# Patient Record
Sex: FEMALE | Race: WHITE
[De-identification: names, ages, dates, MRNs, and addresses within clinical notes are randomized per-mention and may not be internally consistent; named-entity substitution may affect disease eponyms.]

---

## 2019-02-06 ENCOUNTER — HOSPITAL ENCOUNTER (OUTPATIENT)
Dept: HOSPITAL 76 - DI | Age: 59
Discharge: HOME | End: 2019-02-06
Attending: FAMILY MEDICINE
Payer: COMMERCIAL

## 2019-02-06 DIAGNOSIS — M85.89: Primary | ICD-10-CM

## 2019-02-06 DIAGNOSIS — Z78.0: ICD-10-CM

## 2019-02-06 DIAGNOSIS — R29.890: ICD-10-CM

## 2019-02-06 PROCEDURE — 77080 DXA BONE DENSITY AXIAL: CPT

## 2019-02-06 NOTE — DEXA REPORT
Reason:  ASYMPTOMATIC MENOPAUSAL STATE,LOSS OF HEIGHT

Procedure Date:  02/06/2019   

Accession Number:  [id number] / [id number]                    

Procedure:  DEX - Dexa Spine and/or Hip CPT Code:  

 

FULL RESULT:

 

 

EXAM:

DUAL EMISSION X-RAY ABSORPTIOMETRY (DXA) SCAN

 

EXAM DATE: 2/6/2019 03:09 PM.

 

CLINICAL HISTORY: ASYMPTOMATIC MENOPAUSAL STATE, LOSS OF HEIGHT.

 

COMPARISON: None.

 

ADDITIONAL PATIENT INFORMATION: 58-year-old female currently on hormone 

replacement therapy. Menopause age 48.

 

TECHNIQUE: Dual energy x-ray absorptiometry (DXA) was performed on a [employer] System. Regions measured at the AP spine, femoral neck, and if 

needed, forearm.

 

TECHNIQUE LIMITATIONS/EXCLUSIONS: None.

 

FINDINGS:

 

Lumbar Spine: Bone mineral density 1.028 g/sq cm, T-score -1.3, Z-score 

-0.1.

 

Femoral Neck: Bone mineral density 0.870 g/sq cm, T-score -1.2, Z-score 

0.0.

 

Total Hip: Bone mineral density 0.821 g/sq cm, T-score -1.5, Z-score -0.6.

IMPRESSION: Osteopenia. Fracture risk is increased.

 

World Health Organization (WHO) Reporting guidelines (based on lowest 

BMD) for postmenopausal and perimenopausal women, men age 50 years and 

older:

 

Normal: T-score at or greater than -1.0

Osteopenia: T-score between -1.1 to -2.4

Osteoporosis: T-score at or less than -2.5

 

[facility]

## 2019-04-24 ENCOUNTER — HOSPITAL ENCOUNTER (OUTPATIENT)
Dept: HOSPITAL 76 - DI | Age: 59
Discharge: HOME | End: 2019-04-24
Attending: INTERNAL MEDICINE
Payer: COMMERCIAL

## 2019-04-24 DIAGNOSIS — R59.0: ICD-10-CM

## 2019-04-24 DIAGNOSIS — M50.31: ICD-10-CM

## 2019-04-24 DIAGNOSIS — D72.820: Primary | ICD-10-CM

## 2019-04-24 DIAGNOSIS — R91.8: ICD-10-CM

## 2019-04-24 PROCEDURE — 70491 CT SOFT TISSUE NECK W/DYE: CPT

## 2019-04-24 PROCEDURE — 71260 CT THORAX DX C+: CPT

## 2019-04-24 PROCEDURE — 74177 CT ABD & PELVIS W/CONTRAST: CPT

## 2019-04-24 RX ADMIN — IOVERSOL ONE ML: 678 INJECTION INTRA-ARTERIAL; INTRAVENOUS at 12:59

## 2019-04-24 RX ADMIN — IOVERSOL ONE ML: 678 INJECTION INTRA-ARTERIAL; INTRAVENOUS at 12:58

## 2019-04-24 NOTE — CT REPORT
Reason:  LYMPHOCYTOSIS

Procedure Date:  04/24/2019   

Accession Number:  [id number] / [id number]                    

Procedure:  CT  - Abdomen/Pelvis W CPT Code:  

 

FULL RESULT:

 

 

EXAM:

CT ABDOMEN AND PELVIS

 

EXAM DATE: 4/24/2019 11:08 AM.

 

CLINICAL HISTORY: Lymphocytosis.

 

COMPARISONS: ABDOMEN/PELVIS W/ 09/23/2016 3:17 AM.

 

TECHNIQUE: Routine helical CT imaging was performed through the abdomen 

and pelvis. IV contrast: ISOVUE 300  100mL. Enteric contrast: Yes. 

Reconstructions: Coronal and sagittal.

 

In accordance with CT protocol optimization, one or more of the following 

dose reduction techniques were utilized for this exam: automated exposure 

control, adjustment of mA and/or KV based on patient size, or use of 

iterative reconstructive technique.

 

FINDINGS:

Lung Bases: See separate report.

 

Liver: Hepatic hypodensities are too small to characterize.

 

Gallbladder/Bile Ducts: Unremarkable.

 

Spleen: Normal.

 

Pancreas: Normal.

 

Adrenal Glands: Normal.

 

Kidneys: Normal. No masses or hydronephrosis.

 

Peritoneal Cavity/Bowel: There are a few prominent retroperitoneal nodes, 

subcentimeter for example on image 27 series 3. No lymphadenopathy by 

size criteria. No bowel obstruction. No free fluid or free air.

 

Pelvic Organs: There are prominent inguinal nodes which generally do not 

meet size criteria and demonstrate preserved fatty hilum, morphologically 

borderline appearance of one left inguinal node on image 75 series 3 

axially and image 13 series 5 coronally, short axis 1.1 cm. The bladder 

and visualized pelvic organs are within normal limits.

 

Vasculature: Mild atherosclerosis without aneurysm.

 

Bones: No aggressive osseous lesions.

 

Other: None.

IMPRESSION: A few prominent lymph nodes, none of which meet size criteria.

 

[facility]

## 2019-04-24 NOTE — CT REPORT
Reason:  LYMPHOCYTOSIS

Procedure Date:  04/24/2019   

Accession Number:  [id number] / [id number]                    

Procedure:  CT  - CHEST W CPT Code:  

 

FULL RESULT:

 

 

EXAM:

CT CHEST

 

EXAM DATE: 4/24/2019 11:08 AM.

 

CLINICAL HISTORY: Lymphocytosis.

 

COMPARISONS: None.

 

TECHNIQUE: Routine helical CT imaging was performed through the chest. IV 

contrast: None. Reconstructions: Coronal and sagittal.

 

In accordance with CT protocol optimization, one or more of the following 

dose reduction techniques were utilized for this exam: automated exposure 

control, adjustment of mA and/or KV based on patient size, or use of 

iterative reconstructive technique.

 

FINDINGS:

Lungs/Pleura: There is a number of tiny nodules measuring 3 mm or less 

throughout both lungs, for example 2.3 mm pleural based in the right 

upper lobe on image 25 series 3, 2 mm nodule on image 29 series 3 in the 

right lower lobe, 2 mm nodule on image 27 in the left upper lobe. No 

spiculated nodules, bronchial thickening, consolidation, or edema. 

Pulmonary vasculature is normal. No pericardial or pleural effusion. No 

pneumothorax.

 

Mediastinum: Normal. No adenopathy or masses. The heart and great vessels 

are normal.

 

Bones: Unremarkable.

 

Visualized Abdomen: Multiple hepatic hypodensities which are too small to 

characterize.

 

Other: Prominent lymph nodes are seen in both axillary regions, left 

greater than right with lymph nodes on an individual basis not meeting 

size criteria, morphology of the nodes appears generally preserved.

IMPRESSION: Numerous pulmonary nodules measuring 3 mm or less.

 

Recommend follow-up of the described nodule(s) according to the following 

guidelines:

 

Fleischner Society Recommendations 2017

MacMahon et al. Radiology 2017

 

Solid Nodules-Low Risk Patients:

<6 mm (single or multiple) - No routine follow-up*

 

Solid Nodules-High Risk Patients:

<6 mm (single or multiple) -Optional CT at 12 months*

 

*Nodules < 6mm do not require routine follow-up, but suspicious nodule 

morphology, upper lobe location, or both may warrant 12 month follow-up

 

 

[facility]

## 2019-04-25 NOTE — CT REPORT
Reason:  LYMPHOCYTOSIS

Procedure Date:  04/24/2019   

Accession Number:  [id number] / [id number]                    

Procedure:  CT  - SOFT TISSUE NECK W CPT Code:  

 

FULL RESULT:

 

 

EXAM:

CT SOFT TISSUE NECK WITH CONTRAST.

 

EXAM DATE: 4/24/2019 11:08 AM.

 

HISTORY: Lymphocytosis.

 

COMPARISONS: CT chest, abdomen, and pelvis from today.

 

TECHNIQUE: Routine soft tissue neck CT protocol. Reconstructions: Coronal 

and sagittal. IV contrast: ISOVUE 300  100mL.

 

In accordance with CT protocol optimization, one or more of the following 

dose reduction techniques were utilized for this exam: automated exposure 

control, adjustment of mA and/or KV based on patient size, or use of 

iterative reconstructive technique.

 

FINDINGS:

 

There are shotty lymph nodes seen throughout the neck involving the 

jugular chain bilaterally and the posterior triangle. In addition there 

are some lymph nodes in the neck which are mildly enlarged. A 

representative level 2 lymph node on the left measures 11 mm short axis 

on image 57 series 2 and a representative lymph node in the 

supraclavicular region of the left measures 10 mm short axis image 103 

series 2. A representative lesion on the right at level 2 image 58 series 

2 and at the level 2/3 junction on the right image 68 series 2 measure 10 

mm short axis.

 

No bulky lymphadenopathy is seen in the visualized upper mediastinum. For 

findings in the chest, the reader is referred to the patient's chest CT 

performed today and dictated under separate cover.

 

The thyroid gland is not enlarged. A 13 mm hypoenhancing 

well-circumscribed nodule is seen posteriorly in the right thyroid lobe.

 

There is slight medial position of the left true vocal cord. 

Aryepiglottic folds are symmetric.

 

No mass is present in the nasopharynx. The parapharyngeal fat is 

symmetric.

 

No submandibular mass or parotid mass is present on the right or on the 

left.

 

No mass is present in either orbit.

 

No enhancing mass is present in the visualized brain.

 

Degenerative changes are seen in the cervical spine. Moderate to severe 

degenerative disk disease and osteophyte formation are seen from C3 

through C7.

IMPRESSION:

1. Mildly enlarged lymph nodes and shotty lymph nodes are seen throughout 

the neck. The overall number of lymph nodes is greater than typically 

seen. These could be reactive in nature from an inflammatory process such 

as sarcoidosis. Alternatively, this could reflect a lymphoproliferative 

disorder or metastatic disease.

2. Possible subtle left true vocal cord paralysis.

3. Moderate to severe degenerative disk disease is present from C3 

through C7.

 

[facility]

## 2019-06-13 ENCOUNTER — HOSPITAL ENCOUNTER (OUTPATIENT)
Dept: HOSPITAL 76 - SDS | Age: 59
Discharge: HOME | End: 2019-06-13
Attending: SURGERY
Payer: COMMERCIAL

## 2019-06-13 VITALS — DIASTOLIC BLOOD PRESSURE: 50 MMHG | SYSTOLIC BLOOD PRESSURE: 99 MMHG

## 2019-06-13 DIAGNOSIS — K64.8: ICD-10-CM

## 2019-06-13 DIAGNOSIS — C91.10: ICD-10-CM

## 2019-06-13 DIAGNOSIS — Z12.11: Primary | ICD-10-CM

## 2019-06-13 PROCEDURE — 0DJD8ZZ INSPECTION OF LOWER INTESTINAL TRACT, VIA NATURAL OR ARTIFICIAL OPENING ENDOSCOPIC: ICD-10-PCS | Performed by: SURGERY

## 2019-06-13 PROCEDURE — 45378 DIAGNOSTIC COLONOSCOPY: CPT

## 2020-11-06 ENCOUNTER — HOSPITAL ENCOUNTER (OUTPATIENT)
Dept: HOSPITAL 76 - DI.N | Age: 60
Discharge: HOME | End: 2020-11-06
Attending: GENERAL ACUTE CARE HOSPITAL
Payer: COMMERCIAL

## 2020-11-06 DIAGNOSIS — Z12.31: Primary | ICD-10-CM

## 2020-11-06 PROCEDURE — 77067 SCR MAMMO BI INCL CAD: CPT

## 2020-11-06 PROCEDURE — 77063 BREAST TOMOSYNTHESIS BI: CPT

## 2020-11-09 NOTE — MAMMOGRAPHY REPORT
BILATERAL DIGITAL SCREENING MAMMOGRAM 3D/2D: 11/6/2020

 

CLINICAL: Routine screening.  

 

Comparison is made to exams dated:  9/3/2019 mammogram and 8/29/2018 mammogram - [facility].  The tissue of both breasts is extremely dense, which lowers the sensitivity of mammography.  


 

There are benign calcifications in the left breast.  

No significant masses, calcifications, or other findings are seen in either breast.  

There has been no significant interval change.

 

IMPRESSION: BENIGN

There is no mammographic evidence of malignancy. A 1 year screening mammogram is recommended.  

 

 

This exam was interpreted at Station ID: [id number].  

 

NOTE: For mammograms, a report in lay terms will be sent to the patient. Approximately 15% of breast 
malignancies will not be visualized mammographically. In the management of a palpable breast mass, a 
negative mammogram must not discourage biopsy of a clinically suspicious lesion.

 

Electronically Signed By: Krishna Bruno          

acr/penrad:11/8/2020 15:38:33  

 

 

 

ACR BI-RADS Category 2: Benign Finding(s) 3342F

PARENCHYMAL PATTERN: (VD) - The breast(s) demonstrate(s) extremely dense parenchyma, limiting the sen
sitivity of mammography.

BI-RADS CATEGORY: (2) - 2

RECOMMENDATION: (ANNUAL)  - Recommend routine annual screening mammography.

[id number]

1 year screening

LATERALITY: (B)

## 2022-12-28 ENCOUNTER — HOSPITAL ENCOUNTER (OUTPATIENT)
Dept: HOSPITAL 76 - DI | Age: 62
Discharge: HOME | End: 2022-12-28
Attending: FAMILY MEDICINE
Payer: COMMERCIAL

## 2022-12-28 DIAGNOSIS — M85.89: Primary | ICD-10-CM

## 2022-12-28 NOTE — DEXA REPORT
PROCEDURE: Dexa Spine and/or Hip

 

INDICATIONS: OSTEOPENIA

 

TECHNIQUE: Dual energy x-ray absorptiometry (DXA) was performed on a [employer] System.  Regions measur
ed are the AP Spine, femoral neck, and if needed forearm.

 

COMPARISON: 2/6/2019.

  

FINDINGS:

 

Lumbar Spine: 

Bone Mineral Density   0.973 g/cm/cm,T score  -1.7. There is interval 5.4% decreased total lumbar spi
ne bone mineral density.

 

Left Femoral Neck:

Bone Mineral Density  0.793 g/cm/cm, T score -1.8.

 

Left Hip:

Bone Mineral Density  0.799 g/cm/cm,T score -1.7. There is interval 2.7% decrease in total left hip b
one mineral density.

 

(T score greater or equal to -1.0: NORMAL)

(T score from -1.1 to -2.4: OSTEOPENIA)

(T score less than or equal to -2.5 to: OSTEOPOROSIS)

 

Impression: 

Osteopenia.

 

Patients with diagnosis of osteoporosis or osteopenia should have regular bone mineral density assess
ment.  For those eligible for Medicare, routine testing is allowed once every 2 years.  Testing frequ
ency can be increased for patients who have rapidly progressing disease or for those who are receivin
g medical therapy to restore bone mass.

 

Reviewed by: David Youssef MD on 12/28/2022 5:35 PM PST

Approved by: David Youssef MD on 12/28/2022 5:35 PM PST

 

 

Station ID:  [id number]

## 2023-04-05 ENCOUNTER — HOSPITAL ENCOUNTER (OUTPATIENT)
Dept: HOSPITAL 76 - LAB | Age: 63
Discharge: HOME | End: 2023-04-05
Attending: INTERNAL MEDICINE
Payer: MEDICARE

## 2023-04-05 DIAGNOSIS — Z11.59: Primary | ICD-10-CM

## 2023-04-05 PROCEDURE — 86787 VARICELLA-ZOSTER ANTIBODY: CPT

## 2023-04-06 LAB
VZV IGG SER IA-ACNC: 223 INDEX
VZV IGM SER IA-ACNC: <0.91 INDEX (ref 0–0.9)